# Patient Record
Sex: FEMALE | Race: WHITE
[De-identification: names, ages, dates, MRNs, and addresses within clinical notes are randomized per-mention and may not be internally consistent; named-entity substitution may affect disease eponyms.]

---

## 2020-07-01 ENCOUNTER — HOSPITAL ENCOUNTER (OUTPATIENT)
Dept: HOSPITAL 19 - SDCO | Age: 50
Discharge: HOME | End: 2020-07-01
Attending: INTERNAL MEDICINE
Payer: OTHER GOVERNMENT

## 2020-07-01 VITALS — HEART RATE: 64 BPM | DIASTOLIC BLOOD PRESSURE: 73 MMHG | SYSTOLIC BLOOD PRESSURE: 127 MMHG

## 2020-07-01 VITALS — SYSTOLIC BLOOD PRESSURE: 132 MMHG | DIASTOLIC BLOOD PRESSURE: 70 MMHG | HEART RATE: 54 BPM | TEMPERATURE: 97.8 F

## 2020-07-01 VITALS — WEIGHT: 145.28 LBS | HEIGHT: 58 IN | BODY MASS INDEX: 30.5 KG/M2

## 2020-07-01 VITALS — TEMPERATURE: 97.6 F | DIASTOLIC BLOOD PRESSURE: 83 MMHG | SYSTOLIC BLOOD PRESSURE: 138 MMHG | HEART RATE: 59 BPM

## 2020-07-01 VITALS — SYSTOLIC BLOOD PRESSURE: 137 MMHG | HEART RATE: 52 BPM | DIASTOLIC BLOOD PRESSURE: 75 MMHG

## 2020-07-01 DIAGNOSIS — Z12.11: Primary | ICD-10-CM

## 2020-07-01 DIAGNOSIS — R87.810: ICD-10-CM

## 2020-07-01 DIAGNOSIS — K57.30: ICD-10-CM

## 2020-07-01 DIAGNOSIS — Z88.5: ICD-10-CM

## 2020-07-01 DIAGNOSIS — K64.8: ICD-10-CM

## 2020-07-01 DIAGNOSIS — Z79.899: ICD-10-CM

## 2020-07-01 DIAGNOSIS — I10: ICD-10-CM

## 2020-07-01 DIAGNOSIS — Z88.1: ICD-10-CM

## 2020-07-01 NOTE — NUR
Pt to [facility] via cart from [facility]. Pt awakens easily to name call. Pt denies
pain or nausea. Pt ambulates to recliner with stand by assist x2.  in
room. Juice and crackers provided per pt request. Warm blankets placed on pt.
Will continue to monitor. Call light within reach.

## 2020-07-01 NOTE — NUR
Discharge instructions reviewed. Pt voices understanding. Pt escorted to
private car via wheel chair. Pt accompanied home by her .